# Patient Record
Sex: MALE | Race: WHITE | NOT HISPANIC OR LATINO | URBAN - METROPOLITAN AREA
[De-identification: names, ages, dates, MRNs, and addresses within clinical notes are randomized per-mention and may not be internally consistent; named-entity substitution may affect disease eponyms.]

---
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
## 2023-05-30 ENCOUNTER — APPOINTMENT (OUTPATIENT)
Dept: ORTHOPEDIC SURGERY | Facility: CLINIC | Age: 23
End: 2023-05-30
Payer: COMMERCIAL

## 2023-05-30 VITALS — WEIGHT: 285 LBS | HEIGHT: 68 IN | BODY MASS INDEX: 43.19 KG/M2

## 2023-05-30 DIAGNOSIS — S93.492A SPRAIN OF OTHER LIGAMENT OF LEFT ANKLE, INITIAL ENCOUNTER: ICD-10-CM

## 2023-05-30 PROCEDURE — 73630 X-RAY EXAM OF FOOT: CPT | Mod: LT

## 2023-05-30 PROCEDURE — 73610 X-RAY EXAM OF ANKLE: CPT | Mod: LT

## 2023-05-30 PROCEDURE — 99203 OFFICE O/P NEW LOW 30 MIN: CPT

## 2023-05-30 NOTE — HISTORY OF PRESENT ILLNESS
[de-identified] : 23-year-old male is here today for evaluation of his left ankle and foot.  Patient states he was taking a step down off of a high step and rolled his ankle.  Since then he has been having pain.  This happened on Saturday.  He states the pain has improved since then but is still having swelling and pain with walking.  He has been icing and elevating with some relief.  He states he has some mild pain in the foot.  He denies any numbness tingling or any calf pain.

## 2023-05-30 NOTE — IMAGING
[de-identified] : On examination of his left ankle and foot he has swelling and ecchymosis over the lateral aspect of the ankle.  He is nontender over the medial or lateral malleolus.  He is tender over the ATFL CFL and PT FL.  He is nontender over the deltoid ligament.  He is nontender over the talar dome.  He is nontender over the midfoot or the metatarsals.  He is nontender over the Lisfranc joint.  No tenderness over the Achilles or the calcaneus, negative Bashir's test, no calf tenderness.  He can dorsiflex and plantarflex, sensation is intact throughout, 2+ DP and PT pulses\par \par X-rays taken in the office today of the left ankle and foot show no acute fractures, dislocations, or other bony abnormalities.

## 2023-05-30 NOTE — DISCUSSION/SUMMARY
[de-identified] : At this time I placed him in an Aircast, he can weight-bear as tolerated.  Rest, ice, elevate, Tylenol or Motrin as needed for pain. The patient understands that these injuries take 4-6 weeks to heal. The 1st 2 weeks are always the worst. Bruising and swelling is common for the next several weeks.  The more they are on their feet in a day, the more swollen they will be at the end of the day.  I discussed with him scheduling a follow-up in a few weeks for repeat evaluation of his pain is not improving.  He states next week he is leaving to go out of state for work so he is not sure when he will be back here so he will follow-up as needed. Patient will call me if any other problems or concerns.  Patient verbalized understanding and agreed with the plan, all questions were answered in the office today.\par